# Patient Record
Sex: MALE | Race: WHITE | NOT HISPANIC OR LATINO | Employment: STUDENT | URBAN - METROPOLITAN AREA
[De-identification: names, ages, dates, MRNs, and addresses within clinical notes are randomized per-mention and may not be internally consistent; named-entity substitution may affect disease eponyms.]

---
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
## 2023-05-17 ENCOUNTER — OFFICE VISIT (OUTPATIENT)
Dept: NEUROLOGY | Facility: CLINIC | Age: 18
End: 2023-05-17

## 2023-05-17 VITALS
HEIGHT: 70 IN | BODY MASS INDEX: 23.82 KG/M2 | TEMPERATURE: 97.3 F | HEART RATE: 221 BPM | OXYGEN SATURATION: 99 % | DIASTOLIC BLOOD PRESSURE: 74 MMHG | SYSTOLIC BLOOD PRESSURE: 118 MMHG

## 2023-05-17 DIAGNOSIS — G91.0 COMMUNICATING HYDROCEPHALUS (HCC): ICD-10-CM

## 2023-05-17 DIAGNOSIS — R25.2 SPASTICITY: ICD-10-CM

## 2023-05-17 DIAGNOSIS — G40.201 PARTIAL SYMPTOMATIC EPILEPSY WITH COMPLEX PARTIAL SEIZURES, NOT INTRACTABLE, WITH STATUS EPILEPTICUS (HCC): Primary | ICD-10-CM

## 2023-05-17 DIAGNOSIS — S06.9X5S TRAUMATIC BRAIN INJURY, WITH LOSS OF CONSCIOUSNESS GREATER THAN 24 HOURS WITH RETURN TO PRE-EXISTING CONSCIOUS LEVEL, SEQUELA (HCC): ICD-10-CM

## 2023-05-17 DIAGNOSIS — G82.20: ICD-10-CM

## 2023-05-17 NOTE — PATIENT INSTRUCTIONS
Continue current seizure medications unchanged  Let us know if there are seizures or problems with your medication  Check levetiracetam level if onto checked in the past year  Return in one year to see Anna Condon

## 2023-05-17 NOTE — PROGRESS NOTES
[place] [address] [facility]  Follow Up Visit    Impression/Plan    Mr Sudeep Castro is a 21 y o  male with epilepsy due to severe TBI in 5/2019 presenting for follow up  There were electrographic seizure detected on VEEG in 6/2019 in the setting of decline in mental status  He was initially on phenytoin and levetiracetam  Phenytoin was tapered off  Levetiracetam was increased in 4/2021 for an apparent prolonged generalized tonic clonic seizure  According to staff from [facility] that knows him well there have been no recent events concerning for seizure and levetiracetam has been tolerated well  No change today  Continue levetiracetam 1500 mg twice daily  Recommend checking a level if it has not been checked in the last year  His facility will fax over recent blood work      There is a prior documented history of paroxysmal sympathic hyperactivity  He is on clonidine  His spasticity is treated with baclofen and tizanidine   Baclofen pump implanted in 2022 and seems to be helpful  Patient Instructions   1  Continue current seizure medications unchanged  2  Let us know if there are seizures or problems with your medication  3  Check levetiracetam level if onto checked in the past year  4  Return in one year to see [employer], CRNP  Diagnoses and all orders for this visit:    Partial symptomatic epilepsy with complex partial seizures, not intractable, with status epilepticus ([address] )    Spasticity    Diplegia of lower extremities ([address] )    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[address] )    Traumatic brain injury, with loss of consciousness greater than 24 hours with return to pre-existing conscious level, sequela ([address] )        Kylah Sosa is returning to the [place] [address] [facility] for follow up       Interval Events:   Seizures since last visit: None (prior: generalized tonic-clonic seizure April 2021)  Hospitalizations: baclofen pump implantation    No events "concerning for seizure  No staring spells or episodic decreased responsiveness  Goes to school in [place] and may take a nap after he comes back from school  He interacts with gestures using the right hand and head movements as well as communication using his iPad  Today he is playing mine craft on his iPad  He enjoys school  Staff from [facility] that knows him well feels the baclofen pump is helping his pain and decreased the episodes of diffuse shaking  Staff reports no recent medication changes and that overall he is doing well  Last seen August 2022  Seizure-free at that visit and continued on levetiracetam unchanged  Baclofen pump implanted October 2022  No recent blood work in the EMR  Most recent shunt series was December 2021  Current AEDs:  Levetiracetam 1500 mg twice daily  Medication side effects: None  Medication adherence: Yes     Event/Seizure semiology:  • Electrographic seizures in 6/2019  • Apparent generalized tonic clonic seizure (4/2021)     Special Features  Status epilepticus: reported prolonged generalized tonic clonic seizure in 4/2021  Self Injury Seizures: no  Precipitating Factors: n/a     Epilepsy Risk Factors:  Head injury (moderate/severe)     Prior AEDs:  Phenytoin     Prior Evaluation:  VEEG 6/2019: VEEG showed multiple brief focal electrographic seizures (both right hemisphere and left anterior temporal seizures)      History Reviewed:    The following were reviewed and updated as appropriate: allergies, current medications, past family history, past medical history, past social history, past surgical history and problem list     Social History:   Driving: No  Lives Alone: No  Occupation: on permanent disability      Objective    /74 (BP Location: Left arm, Patient Position: Sitting, Cuff Size: Large)   Pulse (!) 221   Temp (!) 97 3 °F (36 3 °C) (Temporal)   Ht 5' 10\" (1 778 m)   SpO2 99%   BMI 23 82 kg/m²      General Exam  No acute " distress  Neurologic Exam  Mental Status:  Alert and oriented  Interacts quickly with hand gestures, following commands accurately and responding quickly to questions by holding up the correct number of fingers  Interacts well using his right arm on the iPad to manipulate mine craft  Language: Nonverbal   Normal comprehension  Cranial Nerves: Able to count fingers on the left and the right when held up simultaneously  Left facial weakness  Motor: Spastic diplegia  No movement seen in the legs or the left arm  Good strength in the right arm  Coordination: No ataxia when reaching  DTRs: 2+ bicep on the right

## 2023-11-08 RX ORDER — CLONIDINE HYDROCHLORIDE 0.1 MG/1
0.1 TABLET ORAL AS NEEDED
COMMUNITY

## 2023-11-08 RX ORDER — BACLOFEN 10 MG/1
10 TABLET ORAL EVERY 12 HOURS PRN
COMMUNITY

## 2023-11-08 RX ORDER — ACETAMINOPHEN 160 MG/5ML
15 SUSPENSION ORAL EVERY 4 HOURS PRN
COMMUNITY

## 2023-11-08 RX ORDER — LORAZEPAM 2 MG/ML
1 CONCENTRATE ORAL EVERY 4 HOURS PRN
COMMUNITY

## 2023-11-08 RX ORDER — ACETAMINOPHEN 325 MG/1
650 TABLET ORAL 3 TIMES DAILY
COMMUNITY

## 2023-11-08 NOTE — PRE-PROCEDURE INSTRUCTIONS
Pre-Surgery Instructions:   Medication Instructions    acetaminophen (TYLENOL) 160 mg/5 mL liquid Uses PRN- OK to take day of surgery    acetaminophen (TYLENOL) 325 mg tablet Take  pills or liquid as needed    Acidophilus Lactobacillus CAPS Hold day of surgery. albuterol (2.5 mg/3 mL) 0.083 % nebulizer solution Uses PRN- OK to take day of surgery    baclofen 10 mg tablet   Baclofen pump    baclofen 20 mg tablet Baclofen pump    bisacodyl (DULCOLAX) 10 mg suppository Hold day of surgery. carbidopa-levodopa (SINEMET)  mg per tablet Take day of surgery. cetirizine (ZyrTEC) 10 mg tablet Take night before surgery    cloNIDine (CATAPRES) 0.1 mg tablet Take day of surgery. cloNIDine (CATAPRES) 0.1 mg tablet Extra dose if needed    diazePAM 20 MG GEL Uses PRN- OK to take day of surgery    Docusate Sodium (DSS) 100 MG CAPS Hold day of surgery. EPINEPHrine (EPIPEN) 0.3 mg/0.3 mL SOAJ As needed    famotidine (PEPCID) 20 mg tablet Uses PRN- OK to take day of surgery    fluticasone (FLONASE) 50 mcg/act nasal spray Uses PRN- OK to take day of surgery    ibuprofen (MOTRIN) 100 mg/5 mL suspension Stop taking 3 days prior to surgery. levETIRAcetam (KEPPRA) 100 mg/mL oral solution Take day of surgery. LORazepam (ATIVAN) 2 mg/mL concentrated solution Uses PRN- OK to take day of surgery    magnesium hydroxide (MILK OF MAGNESIA) 400 mg/5 mL oral suspension Hold day of surgery. montelukast (SINGULAIR) 10 mg tablet Take night before surgery    polyethylene glycol (GLYCOLAX) 17 GM/SCOOP powder Hold day of surgery. pyrithione zinc (HEAD AND SHOULDERS) 1 % shampoo Uses PRN- OK to take day of surgery    sertraline (ZOLOFT) 25 mg tablet Take night before surgery    sertraline (ZOLOFT) 50 mg tablet Take night before surgery    testosterone cypionate (DEPO-TESTOSTERONE) 200 mg/mL SOLN Hold day of surgery.     tiZANidine (ZANAFLEX) 2 mg tablet Take night before surgery    tiZANidine (ZANAFLEX) 4 mg tablet Take night before surgery    traZODone (DESYREL) 100 mg tablet Take night before surgery    Medication instructions for day surgery reviewed. Please use only a sip of water to take your instructed medications. Avoid all over the counter vitamins, supplements and NSAIDS for one week prior to surgery per anesthesia guidelines. Tylenol is ok to take as needed. You will receive a call one business day prior to surgery with an arrival time and hospital directions. If your surgery is scheduled on a Monday, the hospital will be calling you on the Friday prior to your surgery. If you have not heard from anyone by 8pm, please call the hospital supervisor through the hospital  at [phone]. Jacobo Rack [phone]). Do not eat or drink anything after midnight the night before your surgery, including candy, mints, lifesavers, or chewing gum. Do not drink alcohol 24hrs before your surgery. Try not to smoke at least 24hrs before your surgery. Follow the pre surgery showering instructions as listed in the [facility] Surgical Experience Booklet” or otherwise provided by your surgeon's office. Do not use a blade to shave the surgical area 1 week before surgery. It is okay to use a clean electric clippers up to 24 hours before surgery. Do not apply any lotions, creams, including makeup, cologne, deodorant, or perfumes after showering on the day of your surgery. Do not use dry shampoo, hair spray, hair gel, or any type of hair products. No contact lenses, eye make-up, or artificial eyelashes. Remove nail polish, including gel polish, and any artificial, gel, or acrylic nails if possible. Remove all jewelry including rings and body piercing jewelry. Wear causal clothing that is easy to take on and off. Consider your type of surgery. Keep any valuables, jewelry, piercings at home. Please bring any specially ordered equipment (sling, braces) if indicated.     Arrange for a responsible person to drive you to and from the hospital on the day of your surgery. Visitor Guidelines discussed. Call the surgeon's office with any new illnesses, exposures, or additional questions prior to surgery. Please reference your [facility] Surgical Experience Booklet” for additional information to prepare for your upcoming surgery.

## 2023-11-12 ENCOUNTER — ANESTHESIA EVENT (OUTPATIENT)
Dept: PERIOP | Facility: HOSPITAL | Age: 18
End: 2023-11-12
Payer: MEDICARE

## 2023-11-12 PROBLEM — G82.50 QUADRIPLEGIA (HCC): Status: ACTIVE | Noted: 2023-11-12

## 2023-11-12 NOTE — ANESTHESIA PREPROCEDURE EVALUATION
Procedure:  EXTRACTION TOOTH # 14 (Mouth)    Relevant Problems   ANESTHESIA (within normal limits)      CARDIO (within normal limits)      ENDO (within normal limits)      GI/HEPATIC (within normal limits)  NPO confirmed  BMI 23.1      /RENAL (within normal limits)      HEMATOLOGY   (+) Anemia      NEURO/PSYCH   (+) Seizures (HCC)      PULMONARY (within normal limits)   (-) URI (upper respiratory infection)      Digestive   (+) Gastroparesis      Endocrine   (+) Diabetes insipidus, central   (+) Primary hypopituitarism (HCC)      Nervous and Auditory   (+) Autonomic dysfunction   (+) Traumatic brain injury ([address])   -baclofen pump implanted Oct 2022  -trach closed, PEG in situ     Allergies   Allergen Reactions    Bee Venom Other (See Comments) and Throat Swelling     Reaction not specified - Honeybee only      Social History     Tobacco Use    Smoking status: Former     Types: Cigarettes     Quit date: 2019     Years since quittin.4    Smokeless tobacco: Never   Vaping Use    Vaping Use: Never used   Substance Use Topics    Alcohol use: Not Currently     Alcohol/week: 0.0 standard drinks of alcohol    Drug use: Not Currently     Types: Marijuana     Current Outpatient Medications   Medication Instructions    acetaminophen (TYLENOL) 160 mg/5 mL liquid 15 mg/kg, Oral, Every 4 hours PRN, 20.3ml    acetaminophen (TYLENOL) 650 mg, Oral, 3 times daily    Acidophilus Lactobacillus CAPS 1 capsule, Oral, Daily    albuterol 2.5 mg, Nebulization, Every 4 hours PRN    baclofen 20 mg, Oral, 4 times daily    baclofen 10 mg, Oral, Every 12 hours PRN, Has baclofen pump    benzoyl peroxide 5 % gel 2 times daily    bisacodyl (DULCOLAX) 10 mg, Rectal, As needed, (no BM in 48 hrs)     carbidopa-levodopa (SINEMET)  mg per tablet 1.5 tablets, Oral, 3 times daily    cetirizine (ZYRTEC) 10 mg, Oral, Daily    cloNIDine (CATAPRES) 0.1 mg, Oral, 2 times daily, (hold if HR <55)    cloNIDine (CATAPRES) 0.1 mg, Oral, As needed, Give 0.5tab  every 8 hrs as needed for SBP>180 hold for HR <55    diazePAM 20 mg, Rectal, As needed    EPINEPHrine (EPIPEN) 0.3 mg, Intramuscular, Once as needed, (bee stings)     famotidine (PEPCID) 20 mg, Oral, Every 12 hours    FLUoxetine (PROZAC) 20 mg, Per G Tube, Daily, Not taking    fluticasone (FLONASE) 50 mcg/act nasal spray 2 sprays, Nasal, Daily    ibuprofen (MOTRIN) 100 mg/5 mL suspension 20 mL, Oral, Every 6 hours PRN    levETIRAcetam (KEPPRA) 100 mg/mL oral solution 15 ml po bid    LORazepam (ATIVAN) 0.5 mg, Oral, Every 6 hours PRN, Not taking    LORazepam (ATIVAN) 1 mg, Oral, Every 4 hours PRN, 1mg every 4 hrs as needed for seizures    magnesium hydroxide (MILK OF MAGNESIA) 400 mg/5 mL oral suspension Oral, Daily PRN    Magnesium Sulfate, Laxative, GRAN Does not apply, 3 times daily, Not taking    montelukast (SINGULAIR) 10 mg, Oral, Daily at bedtime    polyethylene glycol (GLYCOLAX) 17 g, Oral, As needed    pyrithione zinc (HEAD AND SHOULDERS) 1 % shampoo 1 application. , Apply externally    senna-docusate sodium (SENOKOT-S) 8.6-50 mg per tablet 1 tablet, Oral, As needed    sertraline (ZOLOFT) 50 mg, Oral, Daily    sertraline (ZOLOFT) 25 mg, Oral, Daily    sodium chloride (OCEAN) 0.65 % nasal spray 1 spray, 3 times daily PRN    testosterone cypionate (DEPO-TESTOSTERONE) 100 mg, Intramuscular, Every 30 days    tiZANidine (ZANAFLEX) 8 mg, Oral, Daily at bedtime    tiZANidine (ZANAFLEX) 2 mg, Oral, 3 times daily, Give 6mg total    traZODone (DESYREL) 200 mg, Oral, Daily at bedtime     Lab Results   Component Value Date    WBC 5.33 12/12/2019    HGB 10.5 (L) 12/12/2019    HCT 33.3 (L) 12/12/2019     12/12/2019    SODIUM 141 12/16/2019    K 3.8 12/16/2019     12/16/2019    CO2 28 12/16/2019    BUN 11 12/16/2019    CREATININE 1.04 12/16/2019    GLUC 101 12/16/2019    AST 11 09/16/2019    ALT 43 09/16/2019    ALKPHOS 92 09/16/2019    TBILI 0.16 (L) 09/16/2019    ALB 3.5 09/16/2019    PROTIME 13.2 12/10/2019    PTT 28 12/10/2019    INR 1.04 12/10/2019     Vitals:    11/13/23 0755   BP: 124/71   Pulse: 60   Temp: 98.2 °F (36.8 °C)   SpO2: 95%       Physical Exam    Airway  Comment: Stoma closed  Mallampati score: II  TM Distance: >3 FB  Neck ROM: full     Dental   Comment: Poor dentition, caregiver and patient unaware of any loose teeth, No notable dental hx     Cardiovascular  Rhythm: regular, Rate: normal, Cardiovascular exam normal    Pulmonary  Pulmonary exam normal Breath sounds clear to auscultation    Other Findings        Anesthesia Plan  ASA Score- 3     Anesthesia Type- general with ASA Monitors. Additional Monitors:     Airway Plan: ETT. Plan Factors-Exercise tolerance (METS): <4 METS. Chart reviewed. Existing labs reviewed. Patient summary reviewed. Patient is not a current smoker. Induction- intravenous. Postoperative Plan- Plan for postoperative opioid use. Informed Consent- Anesthetic plan and risks discussed with patient (caregiver present, patient able to answer yes/no questions, consent signed by caregiver after confirmed with patient). I personally reviewed this patient with the CRNA. Discussed and agreed on the Anesthesia Plan with the CRNA. Catherine Pete

## 2023-11-13 ENCOUNTER — HOSPITAL ENCOUNTER (OUTPATIENT)
Facility: HOSPITAL | Age: 18
Setting detail: OUTPATIENT SURGERY
Discharge: HOME | End: 2023-11-13
Attending: DENTIST | Admitting: DENTIST
Payer: MEDICARE

## 2023-11-13 ENCOUNTER — ANESTHESIA (OUTPATIENT)
Dept: PERIOP | Facility: HOSPITAL | Age: 18
End: 2023-11-13
Payer: MEDICARE

## 2023-11-13 VITALS
RESPIRATION RATE: 18 BRPM | SYSTOLIC BLOOD PRESSURE: 116 MMHG | HEIGHT: 70 IN | DIASTOLIC BLOOD PRESSURE: 64 MMHG | OXYGEN SATURATION: 93 % | BODY MASS INDEX: 23.05 KG/M2 | WEIGHT: 161 LBS | TEMPERATURE: 97.4 F | HEART RATE: 87 BPM

## 2023-11-13 DIAGNOSIS — K02.9 DENTAL CARIES, UNSPECIFIED: Primary | ICD-10-CM

## 2023-11-13 RX ORDER — SODIUM CHLORIDE, SODIUM LACTATE, POTASSIUM CHLORIDE, CALCIUM CHLORIDE 600; 310; 30; 20 MG/100ML; MG/100ML; MG/100ML; MG/100ML
INJECTION, SOLUTION INTRAVENOUS CONTINUOUS PRN
Status: DISCONTINUED | OUTPATIENT
Start: 2023-11-13 | End: 2023-11-13

## 2023-11-13 RX ORDER — SODIUM CHLORIDE, SODIUM LACTATE, POTASSIUM CHLORIDE, CALCIUM CHLORIDE 600; 310; 30; 20 MG/100ML; MG/100ML; MG/100ML; MG/100ML
75 INJECTION, SOLUTION INTRAVENOUS CONTINUOUS
Status: DISCONTINUED | OUTPATIENT
Start: 2023-11-13 | End: 2023-11-13 | Stop reason: HOSPADM

## 2023-11-13 RX ORDER — LIDOCAINE HYDROCHLORIDE 10 MG/ML
1 INJECTION, SOLUTION EPIDURAL; INFILTRATION; INTRACAUDAL; PERINEURAL ONCE
Status: DISCONTINUED | OUTPATIENT
Start: 2023-11-13 | End: 2023-11-13 | Stop reason: HOSPADM

## 2023-11-13 RX ORDER — SENNA AND DOCUSATE SODIUM 50; 8.6 MG/1; MG/1
1 TABLET, FILM COATED ORAL AS NEEDED
COMMUNITY

## 2023-11-13 RX ORDER — HYDROCODONE BITARTRATE AND ACETAMINOPHEN 5; 325 MG/1; MG/1
1 TABLET ORAL EVERY 6 HOURS PRN
Status: CANCELLED | OUTPATIENT
Start: 2023-11-13

## 2023-11-13 RX ORDER — LIDOCAINE HYDROCHLORIDE AND EPINEPHRINE 10; 10 MG/ML; UG/ML
INJECTION, SOLUTION INFILTRATION; PERINEURAL AS NEEDED
Status: DISCONTINUED | OUTPATIENT
Start: 2023-11-13 | End: 2023-11-13 | Stop reason: HOSPADM

## 2023-11-13 RX ORDER — PROPOFOL 10 MG/ML
INJECTION, EMULSION INTRAVENOUS CONTINUOUS PRN
Status: DISCONTINUED | OUTPATIENT
Start: 2023-11-13 | End: 2023-11-13

## 2023-11-13 RX ORDER — BUPIVACAINE HYDROCHLORIDE 5 MG/ML
INJECTION, SOLUTION EPIDURAL; INTRACAUDAL AS NEEDED
Status: DISCONTINUED | OUTPATIENT
Start: 2023-11-13 | End: 2023-11-13 | Stop reason: HOSPADM

## 2023-11-13 RX ORDER — ONDANSETRON 2 MG/ML
4 INJECTION INTRAMUSCULAR; INTRAVENOUS ONCE AS NEEDED
Status: DISCONTINUED | OUTPATIENT
Start: 2023-11-13 | End: 2023-11-13 | Stop reason: HOSPADM

## 2023-11-13 RX ORDER — PROPOFOL 10 MG/ML
INJECTION, EMULSION INTRAVENOUS AS NEEDED
Status: DISCONTINUED | OUTPATIENT
Start: 2023-11-13 | End: 2023-11-13

## 2023-11-13 RX ORDER — MIDAZOLAM HYDROCHLORIDE 2 MG/2ML
INJECTION, SOLUTION INTRAMUSCULAR; INTRAVENOUS AS NEEDED
Status: DISCONTINUED | OUTPATIENT
Start: 2023-11-13 | End: 2023-11-13

## 2023-11-13 RX ORDER — SODIUM CHLORIDE, SODIUM LACTATE, POTASSIUM CHLORIDE, CALCIUM CHLORIDE 600; 310; 30; 20 MG/100ML; MG/100ML; MG/100ML; MG/100ML
125 INJECTION, SOLUTION INTRAVENOUS CONTINUOUS
Status: DISCONTINUED | OUTPATIENT
Start: 2023-11-13 | End: 2023-11-13 | Stop reason: HOSPADM

## 2023-11-13 RX ORDER — DIPHENHYDRAMINE HYDROCHLORIDE 50 MG/ML
12.5 INJECTION INTRAMUSCULAR; INTRAVENOUS ONCE AS NEEDED
Status: DISCONTINUED | OUTPATIENT
Start: 2023-11-13 | End: 2023-11-13 | Stop reason: HOSPADM

## 2023-11-13 RX ORDER — HYDROCODONE BITARTRATE AND ACETAMINOPHEN 5; 325 MG/1; MG/1
1 TABLET ORAL EVERY 6 HOURS PRN
Qty: 8 TABLET | Refills: 0 | Status: SHIPPED | OUTPATIENT
Start: 2023-11-13 | End: 2023-11-23

## 2023-11-13 RX ORDER — FENTANYL CITRATE/PF 50 MCG/ML
25 SYRINGE (ML) INJECTION
Status: DISCONTINUED | OUTPATIENT
Start: 2023-11-13 | End: 2023-11-13 | Stop reason: HOSPADM

## 2023-11-13 RX ADMIN — PROPOFOL 100 MCG/KG/MIN: 10 INJECTION, EMULSION INTRAVENOUS at 09:39

## 2023-11-13 RX ADMIN — MIDAZOLAM 1 MG: 1 INJECTION INTRAMUSCULAR; INTRAVENOUS at 09:30

## 2023-11-13 RX ADMIN — MIDAZOLAM 1 MG: 1 INJECTION INTRAMUSCULAR; INTRAVENOUS at 09:38

## 2023-11-13 RX ADMIN — PROPOFOL 50 MG: 10 INJECTION, EMULSION INTRAVENOUS at 09:38

## 2023-11-13 RX ADMIN — SODIUM CHLORIDE, SODIUM LACTATE, POTASSIUM CHLORIDE, AND CALCIUM CHLORIDE: .6; .31; .03; .02 INJECTION, SOLUTION INTRAVENOUS at 09:32

## 2023-11-13 RX ADMIN — SODIUM CHLORIDE, SODIUM LACTATE, POTASSIUM CHLORIDE, AND CALCIUM CHLORIDE 75 ML/HR: .6; .31; .03; .02 INJECTION, SOLUTION INTRAVENOUS at 08:31

## 2023-11-13 NOTE — OP NOTE
OPERATIVE REPORT  PATIENT NAME: Mohit Garza    :  2005  MRN: [id number]  Pt Location: BE OR ROOM 07    SURGERY DATE: 2023    Surgeon(s) and Role:     Lyle Finnegan DMD - Primary    Preop Diagnosis:  Dental caries, unspecified [K02.9]    Post-Op Diagnosis Codes:     * Dental caries, unspecified [K02.9]    Procedure(s):  Left - EXTRACTION TOOTH # 14    Specimen(s):  * No specimens in log *    Estimated Blood Loss:   Minimal    Drains:  Gastrostomy/Enterostomy Gastrostomy-jejunostomy 18 Fr. LUQ (Active)   Drain Status Clamped 23   Dressing Status Clean;Dry; Intact 23   Number of days: 1508       External Urinary Catheter Medium (Active)   Collection Container Standard drainage bag 23   Number of days: 4197       Anesthesia Type:   General    Operative Indications:  Dental caries, unspecified [K02.9]    Operative Findings:  Significant plaque build up on teeth, Gross caries tooth #42    Complications:   None    Procedure and Technique:  The patient was greeted in the preoperative area. All the risks and benefits of the procedure were once again explained and the risks of sinus communication as well as lip numbness were explained in detail all questions were answered. Consent had already been signed. Care was then handed back to the anesthesia team.    The patient was brought into the operating room by the anesthesia team and the patient was placed in a supine position where the patient remained for the rest of the case. Anesthesia was able to sedate patient without any complications. Care was then handed back to the OMFS team.    Patient was draped in sterile manner timeout was performed in which the patient was correctly identified by name medical record number as well as a site of the procedure be performed. Patient had received preoperative Antibiotics.  Once a timeout was completed oral cavity was thoroughly suctioned with the Livuer suction the moist  packing was used it as a throat pack. Patient was given local anesthesia at the sites of the extractions with 1% lidocaine with 1-100,000 epinephrine as local anesthesia per anesthesia record. Patient was also given approximately half percent Marcaine plain also at the sites per anesthesia record. A periosteal elevator was used to separate the gingiva from the tooth #14. Full thickness mucoperiosteal flaps elevated at extraction site. Bone was removed around the tooth. The tooth was sectioned. Tooth #14 was then extracted without complication. Surgical sites were thoroughly curetted, bone filed, and irrigated with sterile saline. Gelfoam placed in the extraction socket and closure with 3-0 chromic gut sutures. Next the oral cavity was thoroughly irrigated with sterile saline and suctioned with the Diomicsuer suction. The moist throat packing was removed and the oropharynx was suctioned. I was present for the entire procedure. and A qualified resident physician was not available.     Patient Disposition:  PACU  and extubated and stable        SIGNATURE: Alexsandra Parks DMD  DATE: November 13, 2023  TIME: 9:56 AM

## 2023-11-13 NOTE — DISCHARGE INSTR - AVS FIRST PAGE
POST OPERATIVE INSTRUCTIONS FOLLOWING ORAL SURGERY    Swelling: To reduce swelling, place ice bag on your face up to 12 hours following surgery. This is an important factor in keeping swelling to a minimum. Swelling is common and need not cause alarm. Rinsing: DO NOT RINSE for the first 12 hours after surgery. After 24 hours it is important to rinse, using warm salt water (not over the counter mouthwash) 3 to 4 times a day, particularly after eating. Brush areas of mouth not affected by the surgery starting tomorrow. Spitting: DO NOT SPIT OUT frequent spitting will cause bleeding to continue. Exercise Jaw: In some cases following oral surgery, it becomes difficult to open your mouth. Exercise your jaw frequently by attempting to open your mouth wide. You may experience discomfort at first, however, with continued exercise the discomfort is reduced. Diet: After having oral surgery it is recommended the patient maintain a semi-liquid diet for 24 hours. A regular diet should be resumed as soon as possible, avoiding peanuts, pretzels, and foods with seeds. Vomiting: Occasionally, patients will have nausea after surgery. Tea, ginger ale, and soup broth will help this complication. Pain: A prescription for pain relieving drugs is given when surgery is extensive. For lesser surgical procedures, it is recommended the patient use Motrin or Advil. If you are in pain and the drug you are taking does not help, please contact us and we will try to remedy the situation. Bleeding: Bite on gauze for 30 minutes. If the bleeding continues, bite on new gauze for an additional 30 minutes. If bleeding continues, place a damp tea bag over the socket and continue to bite down for an additional 30 minutes. Frequent gauze changes allow bleeding to continue. Smoking: It is important you DO NOT SMOKE after surgery. Smoking caused dry socket, which is very painful.     Concerns: May call [facility] at [phone]. Emergency: Go to the [address] at [facility] and ask for the Oral Surgeon on call. DO NOT WAIT until your post-operative appointment to consult us. [facility] [phone].

## 2023-11-13 NOTE — ANESTHESIA POSTPROCEDURE EVALUATION
Post-Op Assessment Note    CV Status:  Stable  Pain Score: 0    Pain management: adequate     Mental Status:  Sleepy   Hydration Status:  Stable   PONV Controlled:  None   Airway Patency:  Patent      Post Op Vitals Reviewed: Yes      Staff: Anesthesiologist, CRNA         No notable events documented.     BP   109/55   Temp   97.6   Pulse  83   Resp   16   SpO2   96

## 2023-11-13 NOTE — INTERVAL H&P NOTE
H&P reviewed. After examining the patient I find no changes in the patients condition since the H&P had been written. Plan is extraction of tooth #14 on the top left in the Main OR.   Jan Miller    Vitals:    11/13/23 0755   BP: 124/71   Pulse: 60   Temp: 98.2 °F (36.8 °C)   SpO2: 95%

## 2023-11-13 NOTE — INTERVAL H&P NOTE
H&P reviewed. After examining the patient I find no changes in the patients condition since the H&P had been written. Abdomen is soft, NT, normal bowel sounds, moves all extremities and sensation intact to light touch. Plan is for extraction of tooth #14 in the Main OR today.   Mao Miller      Vitals:    11/13/23 0755   BP: 124/71   Pulse: 60   Temp: 98.2 °F (36.8 °C)   SpO2: 95%

## 2024-01-10 ENCOUNTER — TELEPHONE (OUTPATIENT)
Dept: NEUROLOGY | Facility: CLINIC | Age: 19
End: 2024-01-10

## 2024-01-10 NOTE — TELEPHONE ENCOUNTER
[facility] to help make a yearly follow up visit for patient.    Patient was scheduled with OPAL Kumar on Friday 5/17/24 at 11am, in [place].

## 2024-01-24 NOTE — RESPIRATORY THERAPY NOTE
----- Message from Stephanie Steven sent at 1/24/2024 12:13 PM CST -----  Type: RX Refill Request    Who Called: pt     Have you contacted your pharmacy:    Refill or New Rx:fluticasone propionate (FLONASE) 50 mcg/actuation nasal spray      RX Name and Strength:    How is the patient currently taking it? (ex. 1XDay):    Is this a 30 day or 90 day RX:    Preferred Pharmacy with phone number:  [id number] - [place], [place] [facility] [place] [address] [address] [place] [address]  Phone: [phone] Fax: [phone]        Local or Mail Order:    Ordering Provider:    Would the patient rather a call back or a response via My [facility]? call    Best Call Back Number:[phone] (home)       Additional Information:          RT Ventilator Management Note  Lazara Mckinnon 10 y o  male MRN: [id number]  Unit/Bed#: ICU 07 Encounter: [id number]      Daily Screen       6/29/2019 0728 7/2/2019  0755          Patient safety screen outcome[de-identified]  Passed  Passed      Spont breathing trial % for 30 min:                  Physical Exam:   Assessment Type: (P) Assess only  General Appearance: (P) Awake  Respiratory Pattern: (P) Spontaneous, Assisted  Chest Assessment: (P) Chest expansion symmetrical  Bilateral Breath Sounds: (P) Coarse  Suction: Trach      Resp Comments: (P) Pt placed on second PSV wean  HR 93, 114/55, sats 100%  Pt appears comfortable at this time  RN aware of wean  Goal is to wean 2 hrs as ordered
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
## 2024-05-21 ENCOUNTER — OFFICE VISIT (OUTPATIENT)
Dept: NEUROLOGY | Facility: CLINIC | Age: 19
End: 2024-05-21
Payer: MEDICARE

## 2024-05-21 VITALS — DIASTOLIC BLOOD PRESSURE: 58 MMHG | SYSTOLIC BLOOD PRESSURE: 117 MMHG | HEART RATE: 61 BPM

## 2024-05-21 DIAGNOSIS — R56.9 SEIZURES (HCC): Primary | ICD-10-CM

## 2024-05-21 DIAGNOSIS — S06.9X5S TRAUMATIC BRAIN INJURY, WITH LOSS OF CONSCIOUSNESS GREATER THAN 24 HOURS WITH RETURN TO PRE-EXISTING CONSCIOUS LEVEL, SEQUELA (HCC): ICD-10-CM

## 2024-05-21 PROCEDURE — 99214 OFFICE O/P EST MOD 30 MIN: CPT | Performed by: NURSE PRACTITIONER

## 2024-05-21 RX ORDER — LEVETIRACETAM 500 MG/1
1500 TABLET ORAL 2 TIMES DAILY
Start: 2024-05-21

## 2024-05-21 RX ORDER — ONDANSETRON 4 MG/1
TABLET, FILM COATED ORAL
COMMUNITY
Start: 2024-03-11

## 2024-05-21 RX ORDER — HYDROCODONE BITARTRATE AND ACETAMINOPHEN 5; 325 MG/1; MG/1
TABLET ORAL EVERY 6 HOURS PRN
COMMUNITY
Start: 2023-11-29

## 2024-05-21 NOTE — PATIENT INSTRUCTIONS
- Continue current dose of levetiracetam 1500 mg twice per day  - Let us know if there are seizures, issues or concerns  - Follow up in 1 year or sooner if needed with Dr Galindo

## 2024-05-21 NOTE — PROGRESS NOTES
Patient ID: Dereje Rubio is a 18 y.o. 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     Assessment/Plan:    Seizures (HCC)  Patient has been seizure free since his last visit. Overall doing quite well. On levetiracetam 1500 mg BID without side effects or concerns.     Continue current dose of levetiracetam. If there are seizures, issues or concerns they will call the office. Follow up in 1 year or sooner if needed with Dr Galindo.     Traumatic brain injury (HCC)  At baseline level of functioning. Continue supportive care.     He will Return in about 1 year (around 5/21/2025) for Follow up with Dr Galindo.      Subjective:  Dereje Rubio is a 18 y.o. 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 He was last seen in the office on 5/17/2023 by Dr Galindo. At that time, noted that there were electrographic seizures detected on VEEG in 6/2019 in the setting of decline in mental status. Initially on phenytoin which was transitioned to levetiracetam which was increased in 4/2021 due to an apparent prolonged GTC seizure. He was on tizanidine and baclofen for his spasticity. Noted he was also on clonidine due to history of paroxysmal sympathetic hyperactivity. There were no recurrent seizures at the time of his last visit. Recommended continuing levetiracetam 1500 mg BID and follow up in 1 year.     Patient presents with caretakers from [facility]. His parents are going to be bringing him home soon, once he is finished with school later this year.     Since his last visit, he continues to be seizure free. On levetiracetam 1500 mg twice per day. No concerns regarding side effects.     He does continue to have some difficulty with spasticity. Following with Dr Hernandez. Recently started Botox in his left arm. He has a baclofen  pump and takes PO baclofen 20 mg QID+ 10 mg BID PRN.     Current seizure medications:  - levetiracetam 1500 mg BID  Other medications as per Epic.    Levetiracetam level on 3/22/24 36.6  CBC, CMP, TSh on 3/7/2024    Event/Seizure semiology:  Electrographic seizures in 6/2019  Apparent generalized tonic clonic seizure (4/2021)     Special Features  Status epilepticus: reported prolonged generalized tonic clonic seizure in 4/2021  Self Injury Seizures: no  Precipitating Factors: n/a     Epilepsy Risk Factors:  Head injury (moderate/severe)     Prior AEDs:  Phenytoin     Prior Evaluation:  VEEG 6/2019: VEEG showed multiple brief focal electrographic seizures (both right hemisphere and left anterior temporal seizures).     Social History:   Driving: No  Lives Alone: No  Occupation: on permanent disability    His history was also obtained from his caretakers, who were present at today's visit.      I reviewed March 2024 labs, most recent neurology visit with Dr Galindo on 5/17/2023, as [facility], and summarized above.        Objective:    Blood pressure 117/58, pulse 61.    Physical Exam  No apparent distress.   Appears well nourished.   Mood appropriate for situation     Neurologic Exam  Mental status- alert. Follows simple commands. Uses tablet to communicate. Non verbal    Cranial Nerves- blinks to visual threat, left facial weakness.      Motor- Spastic diplegia. No movement in legs or left arm. Good strength on right.    Sensory-  Intact distally in all extremities to light touch.     DTRs- not assessed    Gait- in stretcher    Coordination- no ataxia of RUE        ROS:  Review of Systems   Constitutional:  Negative for appetite change, fatigue and fever.   HENT: Negative.  Negative for hearing loss, tinnitus, trouble swallowing and voice change.    Eyes: Negative.  Negative for photophobia, pain and visual disturbance.   Respiratory: Negative.  Negative for shortness of breath.     Cardiovascular: Negative.  Negative for palpitations.   Gastrointestinal: Negative.  Negative for nausea and vomiting.   Endocrine: Negative.  Negative for cold intolerance.   Genitourinary: Negative.  Negative for dysuria, frequency and urgency.   Musculoskeletal:  Negative for back pain, gait problem, myalgias, neck pain and neck stiffness.   Skin: Negative.  Negative for rash.   Allergic/Immunologic: Negative.    Neurological: Negative.  Negative for dizziness, tremors, seizures, syncope, facial asymmetry, speech difficulty, weakness, light-headedness, numbness and headaches.   Hematological: Negative.  Does not bruise/bleed easily.   Psychiatric/Behavioral: Negative.  Negative for confusion, hallucinations and sleep disturbance.    All other systems reviewed and are negative.    ROS obtained by MA and reviewed by myself.       This note may have been created using voice recognition software. There may be unintentional errors such as grammatical errors, spelling errors, or pronoun errors.

## 2024-05-23 NOTE — ASSESSMENT & PLAN NOTE
Patient has been seizure free since his last visit. Overall doing quite well. On levetiracetam 1500 mg BID without side effects or concerns.     Continue current dose of levetiracetam. If there are seizures, issues or concerns they will call the office. Follow up in 1 year or sooner if needed with Dr Galindo.

## 2024-08-23 NOTE — SOCIAL WORK
CM placed Food Assistance Resources in the pt's room for the pt's family to review at their earliest convenience  Is This A New Presentation, Or A Follow-Up?: Skin Lesions What Type Of Note Output Would You Prefer (Optional)?: Standard Output Has Your Skin Lesion Been Treated?: not been treated

## 2025-04-30 ENCOUNTER — OFFICE VISIT (OUTPATIENT)
Dept: NEUROLOGY | Facility: CLINIC | Age: 20
End: 2025-04-30
Payer: MEDICARE

## 2025-04-30 VITALS
OXYGEN SATURATION: 96 % | TEMPERATURE: 98 F | HEART RATE: 68 BPM | HEIGHT: 70 IN | SYSTOLIC BLOOD PRESSURE: 112 MMHG | RESPIRATION RATE: 18 BRPM | BODY MASS INDEX: 23.05 KG/M2 | WEIGHT: 161 LBS | DIASTOLIC BLOOD PRESSURE: 68 MMHG

## 2025-04-30 DIAGNOSIS — R25.2 SPASTICITY: ICD-10-CM

## 2025-04-30 DIAGNOSIS — G91.0 COMMUNICATING HYDROCEPHALUS (HCC): ICD-10-CM

## 2025-04-30 DIAGNOSIS — S06.9X5S TRAUMATIC BRAIN INJURY, WITH LOSS OF CONSCIOUSNESS GREATER THAN 24 HOURS WITH RETURN TO PRE-EXISTING CONSCIOUS LEVEL, SEQUELA (HCC): ICD-10-CM

## 2025-04-30 DIAGNOSIS — G40.201 PARTIAL SYMPTOMATIC EPILEPSY WITH COMPLEX PARTIAL SEIZURES, NOT INTRACTABLE, WITH STATUS EPILEPTICUS (HCC): Primary | ICD-10-CM

## 2025-04-30 DIAGNOSIS — G82.20: ICD-10-CM

## 2025-04-30 PROCEDURE — 99214 OFFICE O/P EST MOD 30 MIN: CPT | Performed by: PSYCHIATRY & NEUROLOGY

## 2025-04-30 PROCEDURE — G2211 COMPLEX E/M VISIT ADD ON: HCPCS | Performed by: PSYCHIATRY & NEUROLOGY

## 2025-04-30 RX ORDER — SODIUM FLUORIDE 5 MG/G
GEL, DENTIFRICE DENTAL
COMMUNITY
Start: 2025-03-05

## 2025-04-30 RX ORDER — TRAZODONE HYDROCHLORIDE 150 MG/1
TABLET ORAL
COMMUNITY
Start: 2025-04-26

## 2025-04-30 RX ORDER — CHOLECALCIFEROL (VITAMIN D3) 25 MCG
TABLET ORAL
COMMUNITY
Start: 2025-04-04

## 2025-04-30 RX ORDER — LORAZEPAM 1 MG/1
TABLET ORAL
COMMUNITY
Start: 2025-01-30

## 2025-04-30 RX ORDER — DIAZEPAM 5 MG/1
TABLET ORAL
COMMUNITY
Start: 2025-04-11

## 2025-04-30 NOTE — ASSESSMENT & PLAN NOTE
There is a prior documented history of paroxysmal sympathic hyperactivity. He is on clonidine. His spasticity is treated with baclofen and tizanidine.  Baclofen pump implanted in 2022 and seems to be helpful.

## 2025-04-30 NOTE — PATIENT INSTRUCTIONS
Continue current seizure medications unchanged.   Let us know if there are seizures or problems with your medication.   Have levetiracetam trough level collected once a year.   Return in one year to see OPAL Cruz.

## 2025-04-30 NOTE — PROGRESS NOTES
Review of Systems   Constitutional: Negative.  Negative for appetite change, fatigue and fever.   HENT: Negative.  Negative for hearing loss, tinnitus, trouble swallowing and voice change.    Eyes: Negative.  Negative for photophobia, pain and visual disturbance.   Respiratory: Negative.  Negative for shortness of breath.    Cardiovascular: Negative.  Negative for palpitations.   Gastrointestinal: Negative.  Negative for nausea and vomiting.   Endocrine: Negative.  Negative for cold intolerance.   Genitourinary: Negative.  Negative for dysuria, frequency and urgency.   Musculoskeletal: Negative.  Negative for back pain, gait problem, myalgias, neck pain and neck stiffness.   Skin: Negative.  Negative for rash.   Allergic/Immunologic: Negative.    Neurological:  Positive for seizures (no seizures). Negative for dizziness, tremors, syncope, facial asymmetry, speech difficulty, weakness, light-headedness, numbness and headaches.   Hematological: Negative.  Does not bruise/bleed easily.   Psychiatric/Behavioral: Negative.  Negative for confusion, hallucinations and sleep disturbance.

## 2025-04-30 NOTE — PROGRESS NOTES
[facility]  Follow Up Visit    Impression/Plan        Assessment & Plan  Partial symptomatic epilepsy with complex partial seizures, not intractable, with status epilepticus (HCC)  Dereje Rubio is a 19 y.o. male with epilepsy due to severe TBI in 5/2019 presenting for follow up. There were electrographic seizure detected on VEEG in 6/2019 in the setting of decline in mental status. He was initially on phenytoin and levetiracetam. Phenytoin was tapered off. Levetiracetam was increased in 4/2021 for an apparent prolonged generalized tonic clonic seizure. According to staff from [facility] that knows him well there have been no recent events concerning for seizure and levetiracetam has been tolerated well.  No change today.  Continue levetiracetam 1500 mg twice daily.  Recommend checking a level annually.  I am not making any changes in his dose.  Despite the high trough level I do not think he is experiencing any side effects and he had a prolonged seizure when he was on a lower dose of levetiracetam.          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 He is on clonidine. His spasticity is treated with baclofen and tizanidine.  Baclofen pump implanted in 2022 and seems to be helpful.           Spasticity         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HCC)         Diplegia of lower extremities (HCC)              Patient Instructions   Continue current seizure medications unchanged.   Let us know if there are seizures or problems with your medication.   Have levetiracetam trough level collected once a year.   Return in one year to see OPAL Cruz.          Subjective    Dereje Rubio is returning to the [facility] for follow up.     Interval Events:   Seizures since last visit: None (prior  "4/2021)  Hospitalizations: no    Last seen May 2024.  At that time he was seizure-free and continued on levetiracetam unchanged.  No hospitalizations.  Levetiracetam level collected 3/21/2025 at 5:54 AM was 62.1 (trough level).    Arrives with staff from [facility] where he lives.  They know him well and reports there have been no seizures and there are no concerns for side effects.  They do not feel there is post dose sedation.  He is awake all day.  His mood is good.  His favorite musician is Earnest Quintana.     Current AEDs:  Levetiracetam 1500 mg bid   Medication side effects: None  Medication adherence: Yes    Event/Seizure semiology:  Electrographic seizures in 6/2019  Apparent generalized tonic clonic seizure (4/2021)     Special Features  Status epilepticus: reported prolonged generalized tonic clonic seizure in 4/2021  Self Injury Seizures: no  Precipitating Factors: n/a     Epilepsy Risk Factors:  Head injury (moderate/severe)     Prior AEDs:  Phenytoin     Prior Evaluation:  VEEG 6/2019: VEEG showed multiple brief focal electrographic seizures (both right hemisphere and left anterior temporal seizures).     Social History:   Driving: No  Lives Alone: No  Occupation: on permanent disability         Objective    /68 (BP Location: Left arm, Patient Position: Sitting, Cuff Size: Standard)   Pulse 68   Temp 98 °F (36.7 °C) (Temporal)   Resp 18   Ht 5' 10\" (1.778 m)   Wt 73 kg (161 lb)   SpO2 96%   BMI 23.10 kg/m²      Mental Status:  Alert and oriented.  Interacts quickly with hand gestures, following commands accurately and responding quickly to questions by holding up the correct number of fingers.  Interacts well using his right arm on the iPad to type out full sentences and communicate well.   Language: Nonverbal.  Normal comprehension.  Cranial Nerves:   Left facial weakness.  Motor: Spastic diplegia.  No movement seen in the legs or the left arm.  Good strength in the right arm.  Coordination: " No ataxia when reaching.      I have spent a total time of 30 minutes in caring for this patient on the day of the visit/encounter including Diagnostic results, Risks and benefits of tx options, Instructions for management, Patient and family education, Importance of tx compliance, Impressions, Counseling / Coordination of care, Documenting in the medical record, Reviewing/placing orders in the medical record (including tests, medications, and/or procedures), and Obtaining or reviewing history  .

## (undated) DEVICE — BONE WAX WHITE: Brand: BONE WAX WHITE

## (undated) DEVICE — SUT ETHILON 3-0 FSLX 30 IN 1673H

## (undated) DEVICE — 2000CC GUARDIAN II: Brand: GUARDIAN

## (undated) DEVICE — PAD GROUNDING ADULT

## (undated) DEVICE — SPONGE PVP SCRUB WING STERILE

## (undated) DEVICE — MAGNETIC INSTRUMENT PAD 16" X 20"; LARGE; DISPOSABLE: Brand: CARDINAL HEALTH

## (undated) DEVICE — SUT SILK 3-0 RB-1 30 IN K872H

## (undated) DEVICE — JP PERF DRN SIL FLT 10MM FULL: Brand: CARDINAL HEALTH

## (undated) DEVICE — TOOL F2/8TA23 LEGEND 8CM 2.3MM TAPER: Brand: MIDAS REX™

## (undated) DEVICE — FLOSEAL HEMOSTATIC MATRIX, 5 ML: Brand: FLOSEAL

## (undated) DEVICE — PROXIMATE PLUS MD MULTI-DIRECTIONAL RELEASE SKIN STAPLERS CONTAINS 35 STAINLESS STEEL STAPLES APPROXIMATE CLOSED DIMENSIONS: 6.9MM X 3.9MM WIDE: Brand: PROXIMATE

## (undated) DEVICE — SUT VICRYL 3-0 SH 27 IN J416H

## (undated) DEVICE — GAUZE SPONGES,16 PLY: Brand: CURITY

## (undated) DEVICE — 3M™ IOBAN™ 2 ANTIMICROBIAL INCISE DRAPE 6640EZ: Brand: IOBAN™ 2

## (undated) DEVICE — PASSER 48409 60CM DISP. CATHETER

## (undated) DEVICE — SURGIFOAM 8.5 X 12.5

## (undated) DEVICE — INTENDED FOR TISSUE SEPARATION, AND OTHER PROCEDURES THAT REQUIRE A SHARP SURGICAL BLADE TO PUNCTURE OR CUT.: Brand: BARD-PARKER ® CARBON RIB-BACK BLADES

## (undated) DEVICE — 1200CC GUARDIAN II: Brand: GUARDIAN

## (undated) DEVICE — GLOVE SRG BIOGEL 8

## (undated) DEVICE — NEURO PATTIES 1/2 X 1 1/2

## (undated) DEVICE — Device: Brand: IQ SYSTEM

## (undated) DEVICE — LIGHT HANDLE COVER SLEEVE DISP BLUE STELLAR

## (undated) DEVICE — SPONGE LAP 18 X 18 IN

## (undated) DEVICE — 2.0MM IMF SCREW SELF-DRILLING 8MM
Type: IMPLANTABLE DEVICE | Site: MAXILLA | Status: NON-FUNCTIONAL
Removed: 2019-06-10

## (undated) DEVICE — RANEY SCALP CLIP STERILE: Brand: AESCULAP

## (undated) DEVICE — INTENDED FOR TISSUE SEPARATION, AND OTHER PROCEDURES THAT REQUIRE A SHARP SURGICAL BLADE TO PUNCTURE OR CUT.: Brand: BARD-PARKER SAFETY BLADES SIZE 10, STERILE

## (undated) DEVICE — PLUMEPEN PRO 10FT

## (undated) DEVICE — GLOVE SRG BIOGEL ECLIPSE 8

## (undated) DEVICE — TIBURON SPLIT SHEET: Brand: CONVERTORS

## (undated) DEVICE — STOCKINETTE REGULAR

## (undated) DEVICE — ICP MICROSENSOR KIT BASIC MR CONDITIONAL

## (undated) DEVICE — SUT PLAIN 6-0 PC-1 18 IN 1916G

## (undated) DEVICE — ADULT FLEXIBLE TRACHEOSTOMY TUBE WITH TAPERGUARD CUFF DISPOSABLE INNER CANNULA: Brand: SHILEY

## (undated) DEVICE — GLOVE SRG BIOGEL 7.5

## (undated) DEVICE — GLOVE INDICATOR PI UNDERGLOVE SZ 8 BLUE

## (undated) DEVICE — PENCIL ELECTROSURG E-Z CLEAN -0035H

## (undated) DEVICE — CHLORAPREP HI-LITE 26ML ORANGE

## (undated) DEVICE — PREP SURGICAL PURPREP 26ML

## (undated) DEVICE — 0.5MM PRECUT CERCLAGE WIRE 175MM
Type: IMPLANTABLE DEVICE | Site: MAXILLA | Status: NON-FUNCTIONAL
Removed: 2019-06-10

## (undated) DEVICE — GLOVE SRG BIOGEL ORTHOPEDIC 7.5

## (undated) DEVICE — IV CATH 14 G X 3 1/4 IN

## (undated) DEVICE — GLOVE INDICATOR PI UNDERGLOVE SZ 8.5 BLUE

## (undated) DEVICE — INTENDED FOR TISSUE SEPARATION, AND OTHER PROCEDURES THAT REQUIRE A SHARP SURGICAL BLADE TO PUNCTURE OR CUT.: Brand: BARD-PARKER SAFETY BLADES SIZE 11, STERILE

## (undated) DEVICE — STERILE MANDIBLE PACK: Brand: CARDINAL HEALTH

## (undated) DEVICE — ROSEBUD DISSECTORS: Brand: DEROYAL

## (undated) DEVICE — FLOSEAL HEMOSTATIC MATRIX, 10 ML: Brand: FLOSEAL

## (undated) DEVICE — INTENDED FOR TISSUE SEPARATION, AND OTHER PROCEDURES THAT REQUIRE A SHARP SURGICAL BLADE TO PUNCTURE OR CUT.: Brand: BARD-PARKER SAFETY BLADES SIZE 15, STERILE

## (undated) DEVICE — SUT NUROLON 4-0 TF CR/8 18 IN C584D

## (undated) DEVICE — 3M™ STERI-STRIP™ REINFORCED ADHESIVE SKIN CLOSURES, R1542, 1/4 IN X 1-1/2 IN (6 MM X 38 MM), 6 STRIPS/ENVELOPE: Brand: 3M™ STERI-STRIP™

## (undated) DEVICE — PACK BURR HOLE PBDS RF

## (undated) DEVICE — SPONGE STICK WITH PVP-I: Brand: KENDALL

## (undated) DEVICE — NEEDLE 25G X 1 1/2

## (undated) DEVICE — DRAPE INTESTINAL ISOLATION BAG

## (undated) DEVICE — SYRINGE 10ML LL

## (undated) DEVICE — TRACH TUBE HOLDER

## (undated) DEVICE — SUT VICRYL 0 REEL 54 IN J287G

## (undated) DEVICE — DRAPE EQUIPMENT RF WAND

## (undated) DEVICE — TOOL 9MH30 LEGEND 9CM 3MM MH: Brand: MIDAS REX

## (undated) DEVICE — SURGIFOAM 7 X 12 SPONGE ABS

## (undated) DEVICE — SUT SILK 2-0 SH CR/8 18 IN C012D

## (undated) DEVICE — SUT PROLENE 2-0 FS 18 IN 8685H

## (undated) DEVICE — PACK CRANIOTOMY PBDS RF

## (undated) DEVICE — SUT PROLENE 4-0 RB-1 30 IN 8871H

## (undated) DEVICE — SUT ETHILON 2-0 FSLX 30 IN 1674H

## (undated) DEVICE — SUCTION CATH 18 FR

## (undated) DEVICE — ASTOUND STANDARD SURGICAL GOWN, XL: Brand: CONVERTORS

## (undated) DEVICE — MAYO STAND COVER: Brand: CONVERTORS

## (undated) DEVICE — 3M™ IOBAN™ 2 ANTIMICROBIAL INCISE DRAPE 6650EZ: Brand: IOBAN™ 2

## (undated) DEVICE — BITE BLOCK WITH BLUE LATEX FREE STRAP: Brand: CAREGUARD®

## (undated) DEVICE — PERCUTANEOUS ENDOSCOPIC GASTROSTOMY KIT: Brand: ENDOVIVE SAFETY PEG KIT

## (undated) DEVICE — SUT MONOCRYL PLUS 4-0 PS-2 18 IN MCP496G

## (undated) DEVICE — BETHLEHEM UNIVERSAL OUTPATIENT: Brand: CARDINAL HEALTH

## (undated) DEVICE — SUT SILK 2 60 IN SA8H

## (undated) DEVICE — SUT CHROMIC 4-0 PS-2 18 IN 1637G

## (undated) DEVICE — SYRINGE BULB 2 OZ

## (undated) DEVICE — SYRINGE 20ML LL

## (undated) DEVICE — TOOL 14BA60 LEGEND 14CM 6MM: Brand: MIDAS REX ™

## (undated) DEVICE — DRAPE SURGIKIT SADDLE BAG

## (undated) DEVICE — ACRA CLIP SINGLE CARTRIDGE

## (undated) DEVICE — PACK PBDS PLASTIC HEAD AND NECK RF

## (undated) DEVICE — JP PERF DRN SIL FLT 7MM FULL: Brand: CARDINAL HEALTH

## (undated) DEVICE — 40529 DERMAPROX PAD 11'' X 15'' X 1'': Brand: 40529 DERMAPROX PAD 11'' X 15'' X 1''

## (undated) DEVICE — DRAPE TOWEL: Brand: CONVERTORS

## (undated) DEVICE — OCULAR CONFORMER - NON VENTED - MEDIUM: Brand: MEDPOR

## (undated) DEVICE — DRAIN SPONGES,6 PLY: Brand: EXCILON

## (undated) DEVICE — ADHESIVE SKN CLSR HISTOACRYL FLEX 0.5ML LF

## (undated) DEVICE — SUT PROLENE 2-0 CT-2 30 IN 8411H

## (undated) DEVICE — BATTERY PACK-STERILE FOR BATTERY POWERED DRIVER

## (undated) DEVICE — ANTIBACTERIAL VIOLET BRAIDED (POLYGLACTIN 910), SYNTHETIC ABSORBABLE SUTURE: Brand: COATED VICRYL

## (undated) DEVICE — JACKSON-PRATT 100CC BULB RESERVOIR: Brand: CARDINAL HEALTH

## (undated) DEVICE — NEEDLE SPINAL 25G X 3.5 IN QUINCKE

## (undated) DEVICE — 2.0MM IMF SCREW SELF-DRILLING 12MM
Type: IMPLANTABLE DEVICE | Site: MAXILLA | Status: NON-FUNCTIONAL
Removed: 2019-06-10

## (undated) DEVICE — TELFA NON-ADHERENT ABSORBENT DRESSING: Brand: TELFA

## (undated) DEVICE — TUBING SUCTION 5MM X 12 FT

## (undated) DEVICE — SUT SILK 2-0 18 IN A185H

## (undated) DEVICE — FLEXIBLE ADHESIVE BANDAGE,X-LARGE: Brand: CURITY

## (undated) DEVICE — SUT VICRYL PLUS 3-0 RB-1 CR/8 18 IN VCP713D

## (undated) DEVICE — ELECTRODE NEEDLE MOD E-Z CLEAN 2.75IN 7CM -0013M

## (undated) DEVICE — Device: Brand: DEFENDO AIR/WATER/SUCTION AND BIOPSY VALVE

## (undated) DEVICE — ANTIBACTERIAL UNDYED BRAIDED (POLYGLACTIN 910), SYNTHETIC ABSORBABLE SUTURE: Brand: COATED VICRYL

## (undated) DEVICE — 3000CC GUARDIAN II: Brand: GUARDIAN